# Patient Record
Sex: MALE | Race: WHITE | NOT HISPANIC OR LATINO | Employment: OTHER | URBAN - METROPOLITAN AREA
[De-identification: names, ages, dates, MRNs, and addresses within clinical notes are randomized per-mention and may not be internally consistent; named-entity substitution may affect disease eponyms.]

---

## 2017-10-10 ENCOUNTER — ESTABLISHED COMPREHENSIVE EXAM (OUTPATIENT)
Dept: URBAN - METROPOLITAN AREA CLINIC 46 | Facility: CLINIC | Age: 77
End: 2017-10-10

## 2017-10-10 DIAGNOSIS — H04.123: ICD-10-CM

## 2017-10-10 DIAGNOSIS — H35.30: ICD-10-CM

## 2017-10-10 DIAGNOSIS — H40.013: ICD-10-CM

## 2017-10-10 DIAGNOSIS — Z96.1: ICD-10-CM

## 2017-10-10 PROCEDURE — G8427 DOCREV CUR MEDS BY ELIG CLIN: HCPCS

## 2017-10-10 PROCEDURE — 2019F DILATED MACUL EXAM DONE: CPT

## 2017-10-10 PROCEDURE — 4177F TALK PT/CRGVR RE AREDS PREV: CPT

## 2017-10-10 PROCEDURE — 92014 COMPRE OPH EXAM EST PT 1/>: CPT

## 2017-10-10 PROCEDURE — G8756 NO BP MEASURE DOC: HCPCS

## 2017-10-10 PROCEDURE — 92015 DETERMINE REFRACTIVE STATE: CPT

## 2017-10-10 PROCEDURE — 1036F TOBACCO NON-USER: CPT

## 2017-10-10 ASSESSMENT — VISUAL ACUITY
OD_CC: J5
OD_SC: J8
OS_SC: 20/50
OD_SC: 20/60+2
OS_CC: J3
OS_SC: J6

## 2017-10-10 ASSESSMENT — TONOMETRY
OS_IOP_MMHG: 17
OD_IOP_MMHG: 16

## 2017-11-02 ENCOUNTER — FOLLOW UP (OUTPATIENT)
Dept: URBAN - METROPOLITAN AREA CLINIC 46 | Facility: CLINIC | Age: 77
End: 2017-11-02

## 2017-11-02 DIAGNOSIS — H40.013: ICD-10-CM

## 2017-11-02 DIAGNOSIS — H35.30: ICD-10-CM

## 2017-11-02 PROCEDURE — 92015GRNC REFRACTION GLASSES RECHECK - NO CHARGE

## 2017-11-02 ASSESSMENT — VISUAL ACUITY
OS_AM: 20/30
OD_PAM: 20/30-1
OD_CC: J3
OS_SC: 20/50+2
OD_SC: 20/50-1
OS_CC: J3

## 2017-11-08 ENCOUNTER — ESTABLISHED PATIENT (OUTPATIENT)
Dept: URBAN - METROPOLITAN AREA CLINIC 46 | Facility: CLINIC | Age: 77
End: 2017-11-08

## 2017-11-08 DIAGNOSIS — H40.013: ICD-10-CM

## 2017-11-08 DIAGNOSIS — H35.30: ICD-10-CM

## 2017-11-08 DIAGNOSIS — H35.352: ICD-10-CM

## 2017-11-08 PROCEDURE — 4040F PNEUMOC VAC/ADMIN/RCVD: CPT

## 2017-11-08 PROCEDURE — 2019F DILATED MACUL EXAM DONE: CPT

## 2017-11-08 PROCEDURE — G8484 FLU IMMUNIZE NO ADMIN: HCPCS

## 2017-11-08 PROCEDURE — 99211 OFF/OP EST MAY X REQ PHY/QHP: CPT

## 2017-11-08 PROCEDURE — 2027F OPTIC NERVE HEAD EVAL DONE: CPT

## 2017-11-08 PROCEDURE — 92134 CPTRZ OPH DX IMG PST SGM RTA: CPT

## 2017-11-08 PROCEDURE — 0517F GLAUCOMA PLAN OF CARE DOCD: CPT

## 2017-11-08 PROCEDURE — 3285F IOP DOWN <15% OF PRE-SVC LVL: CPT

## 2017-11-08 PROCEDURE — 1036F TOBACCO NON-USER: CPT

## 2017-11-08 PROCEDURE — 4177F TALK PT/CRGVR RE AREDS PREV: CPT

## 2017-11-08 PROCEDURE — G8428 CUR MEDS NOT DOCUMENT: HCPCS

## 2017-11-08 ASSESSMENT — TONOMETRY
OD_IOP_MMHG: 15
OS_IOP_MMHG: 17

## 2017-11-08 ASSESSMENT — VISUAL ACUITY
OS_SC: 20/50-2
OD_SC: 20/40

## 2017-11-27 ENCOUNTER — CONSULT (OUTPATIENT)
Dept: URBAN - METROPOLITAN AREA CLINIC 43 | Facility: CLINIC | Age: 77
End: 2017-11-27

## 2017-11-27 DIAGNOSIS — H35.363: ICD-10-CM

## 2017-11-27 DIAGNOSIS — H35.3131: ICD-10-CM

## 2017-11-27 DIAGNOSIS — H35.342: ICD-10-CM

## 2017-11-27 PROCEDURE — 92014 COMPRE OPH EXAM EST PT 1/>: CPT

## 2017-11-27 PROCEDURE — 4177F TALK PT/CRGVR RE AREDS PREV: CPT

## 2017-11-27 PROCEDURE — 92250 FUNDUS PHOTOGRAPHY W/I&R: CPT

## 2017-11-27 PROCEDURE — G8427 DOCREV CUR MEDS BY ELIG CLIN: HCPCS

## 2017-11-27 PROCEDURE — G8756 NO BP MEASURE DOC: HCPCS

## 2017-11-27 PROCEDURE — 2019F DILATED MACUL EXAM DONE: CPT

## 2017-11-27 PROCEDURE — 92134 CPTRZ OPH DX IMG PST SGM RTA: CPT

## 2017-11-27 PROCEDURE — 1036F TOBACCO NON-USER: CPT

## 2017-11-27 PROCEDURE — 92235 FLUORESCEIN ANGRPH MLTIFRAME: CPT

## 2017-11-27 ASSESSMENT — TONOMETRY
OD_IOP_MMHG: 17
OS_IOP_MMHG: 16

## 2017-11-27 ASSESSMENT — VISUAL ACUITY
OD_SC: 20/70
OS_SC: 20/50-1

## 2018-03-30 ENCOUNTER — ESTABLISHED PATIENT (OUTPATIENT)
Dept: URBAN - METROPOLITAN AREA CLINIC 43 | Facility: CLINIC | Age: 78
End: 2018-03-30

## 2018-03-30 DIAGNOSIS — H35.342: ICD-10-CM

## 2018-03-30 DIAGNOSIS — H35.3131: ICD-10-CM

## 2018-03-30 DIAGNOSIS — H35.372: ICD-10-CM

## 2018-03-30 DIAGNOSIS — H35.363: ICD-10-CM

## 2018-03-30 DIAGNOSIS — H35.412: ICD-10-CM

## 2018-03-30 DIAGNOSIS — H35.432: ICD-10-CM

## 2018-03-30 PROCEDURE — 92014 COMPRE OPH EXAM EST PT 1/>: CPT

## 2018-03-30 PROCEDURE — 9222650 BILAT EXTENDED OPHTHALMOSCOPY, F/U

## 2018-03-30 PROCEDURE — 2019F DILATED MACUL EXAM DONE: CPT

## 2018-03-30 PROCEDURE — 4177F TALK PT/CRGVR RE AREDS PREV: CPT

## 2018-03-30 PROCEDURE — G8427 DOCREV CUR MEDS BY ELIG CLIN: HCPCS

## 2018-03-30 PROCEDURE — 92134 CPTRZ OPH DX IMG PST SGM RTA: CPT

## 2018-03-30 PROCEDURE — G8756 NO BP MEASURE DOC: HCPCS

## 2018-03-30 PROCEDURE — 1036F TOBACCO NON-USER: CPT

## 2018-03-30 ASSESSMENT — VISUAL ACUITY
OS_SC: 20/40-2
OD_SC: 20/70-1

## 2018-03-30 ASSESSMENT — TONOMETRY
OD_IOP_MMHG: 15
OS_IOP_MMHG: 18

## 2018-08-09 ENCOUNTER — ESTABLISHED PATIENT (OUTPATIENT)
Dept: URBAN - METROPOLITAN AREA CLINIC 43 | Facility: CLINIC | Age: 78
End: 2018-08-09

## 2018-08-09 DIAGNOSIS — H35.372: ICD-10-CM

## 2018-08-09 DIAGNOSIS — H35.3131: ICD-10-CM

## 2018-08-09 DIAGNOSIS — H35.432: ICD-10-CM

## 2018-08-09 DIAGNOSIS — H35.412: ICD-10-CM

## 2018-08-09 DIAGNOSIS — H35.363: ICD-10-CM

## 2018-08-09 DIAGNOSIS — H35.342: ICD-10-CM

## 2018-08-09 PROCEDURE — 92014 COMPRE OPH EXAM EST PT 1/>: CPT

## 2018-08-09 PROCEDURE — 1036F TOBACCO NON-USER: CPT

## 2018-08-09 PROCEDURE — 4177F TALK PT/CRGVR RE AREDS PREV: CPT

## 2018-08-09 PROCEDURE — 2019F DILATED MACUL EXAM DONE: CPT

## 2018-08-09 PROCEDURE — 9222650 BILAT EXTENDED OPHTHALMOSCOPY, F/U

## 2018-08-09 PROCEDURE — 92134 CPTRZ OPH DX IMG PST SGM RTA: CPT

## 2018-08-09 PROCEDURE — G8756 NO BP MEASURE DOC: HCPCS

## 2018-08-09 PROCEDURE — G8427 DOCREV CUR MEDS BY ELIG CLIN: HCPCS

## 2018-08-09 PROCEDURE — G9903 PT SCRN TBCO ID AS NON USER: HCPCS

## 2018-08-09 ASSESSMENT — VISUAL ACUITY
OD_SC: 20/50-2
OS_SC: 20/50-1

## 2018-08-09 ASSESSMENT — TONOMETRY
OD_IOP_MMHG: 15
OS_IOP_MMHG: 16

## 2018-11-21 ENCOUNTER — ESTABLISHED COMPREHENSIVE EXAM (OUTPATIENT)
Dept: URBAN - METROPOLITAN AREA CLINIC 46 | Facility: CLINIC | Age: 78
End: 2018-11-21

## 2018-11-21 DIAGNOSIS — H35.3131: ICD-10-CM

## 2018-11-21 DIAGNOSIS — H40.013: ICD-10-CM

## 2018-11-21 DIAGNOSIS — H04.123: ICD-10-CM

## 2018-11-21 PROCEDURE — 4177F TALK PT/CRGVR RE AREDS PREV: CPT

## 2018-11-21 PROCEDURE — 92014 COMPRE OPH EXAM EST PT 1/>: CPT

## 2018-11-21 PROCEDURE — 1036F TOBACCO NON-USER: CPT

## 2018-11-21 PROCEDURE — G9903 PT SCRN TBCO ID AS NON USER: HCPCS

## 2018-11-21 PROCEDURE — G8428 CUR MEDS NOT DOCUMENT: HCPCS

## 2018-11-21 PROCEDURE — 92015 DETERMINE REFRACTIVE STATE: CPT

## 2018-11-21 PROCEDURE — 2019F DILATED MACUL EXAM DONE: CPT

## 2018-11-21 ASSESSMENT — VISUAL ACUITY
OD_CC: J2
OD_SC: 20/40-1
OS_CC: J2+2
OS_SC: 20/40
OD_SC: J6+1

## 2018-11-21 ASSESSMENT — TONOMETRY
OD_IOP_MMHG: 17
OS_IOP_MMHG: 18

## 2019-02-07 ENCOUNTER — ESTABLISHED COMPREHENSIVE EXAM (OUTPATIENT)
Dept: URBAN - METROPOLITAN AREA CLINIC 43 | Facility: CLINIC | Age: 79
End: 2019-02-07

## 2019-02-07 DIAGNOSIS — H35.3131: ICD-10-CM

## 2019-02-07 DIAGNOSIS — H35.343: ICD-10-CM

## 2019-02-07 DIAGNOSIS — H43.813: ICD-10-CM

## 2019-02-07 DIAGNOSIS — H35.372: ICD-10-CM

## 2019-02-07 DIAGNOSIS — H35.432: ICD-10-CM

## 2019-02-07 PROCEDURE — 92134 CPTRZ OPH DX IMG PST SGM RTA: CPT

## 2019-02-07 PROCEDURE — 92014 COMPRE OPH EXAM EST PT 1/>: CPT

## 2019-02-07 ASSESSMENT — TONOMETRY
OS_IOP_MMHG: 16
OD_IOP_MMHG: 16

## 2019-02-07 ASSESSMENT — VISUAL ACUITY
OD_CC: 20/60-2
OS_CC: 20/50-2

## 2019-10-28 ENCOUNTER — ESTABLISHED COMPREHENSIVE EXAM (OUTPATIENT)
Dept: URBAN - METROPOLITAN AREA CLINIC 46 | Facility: CLINIC | Age: 79
End: 2019-10-28

## 2019-10-28 DIAGNOSIS — H57.11: ICD-10-CM

## 2019-10-28 DIAGNOSIS — H40.013: ICD-10-CM

## 2019-10-28 PROCEDURE — 92014 COMPRE OPH EXAM EST PT 1/>: CPT

## 2019-10-28 PROCEDURE — 92015 DETERMINE REFRACTIVE STATE: CPT

## 2019-10-28 ASSESSMENT — VISUAL ACUITY
OD_CC: J3
OS_SC: J6
OS_SC: 20/50-2
OD_SC: 20/40-1
OS_CC: J3
OD_SC: J6-1

## 2019-10-28 ASSESSMENT — TONOMETRY
OS_IOP_MMHG: 18
OD_IOP_MMHG: 17

## 2020-07-02 ENCOUNTER — ESTABLISHED COMPREHENSIVE EXAM (OUTPATIENT)
Dept: URBAN - METROPOLITAN AREA CLINIC 43 | Facility: CLINIC | Age: 80
End: 2020-07-02

## 2020-07-02 DIAGNOSIS — H35.412: ICD-10-CM

## 2020-07-02 DIAGNOSIS — H35.432: ICD-10-CM

## 2020-07-02 DIAGNOSIS — H35.3131: ICD-10-CM

## 2020-07-02 DIAGNOSIS — H35.373: ICD-10-CM

## 2020-07-02 DIAGNOSIS — H35.363: ICD-10-CM

## 2020-07-02 DIAGNOSIS — H35.343: ICD-10-CM

## 2020-07-02 PROCEDURE — 92014 COMPRE OPH EXAM EST PT 1/>: CPT

## 2020-07-02 PROCEDURE — 92134 CPTRZ OPH DX IMG PST SGM RTA: CPT

## 2020-07-02 PROCEDURE — 92201 OPSCPY EXTND RTA DRAW UNI/BI: CPT

## 2020-07-02 ASSESSMENT — TONOMETRY
OS_IOP_MMHG: 18
OD_IOP_MMHG: 16

## 2020-07-02 ASSESSMENT — VISUAL ACUITY
OD_SC: 20/60+2
OS_SC: 20/70+1

## 2020-10-05 ENCOUNTER — EST. PATIENT EMERGENCY (OUTPATIENT)
Dept: URBAN - METROPOLITAN AREA CLINIC 36 | Facility: CLINIC | Age: 80
End: 2020-10-05

## 2020-10-05 VITALS — HEIGHT: 60 IN

## 2020-10-05 DIAGNOSIS — H35.412: ICD-10-CM

## 2020-10-05 DIAGNOSIS — H35.363: ICD-10-CM

## 2020-10-05 DIAGNOSIS — H52.13: ICD-10-CM

## 2020-10-05 DIAGNOSIS — H15.832: ICD-10-CM

## 2020-10-05 DIAGNOSIS — H35.343: ICD-10-CM

## 2020-10-05 DIAGNOSIS — H35.3131: ICD-10-CM

## 2020-10-05 DIAGNOSIS — H35.371: ICD-10-CM

## 2020-10-05 DIAGNOSIS — H35.432: ICD-10-CM

## 2020-10-05 DIAGNOSIS — H35.372: ICD-10-CM

## 2020-10-05 DIAGNOSIS — Z96.1: ICD-10-CM

## 2020-10-05 DIAGNOSIS — H33.42: ICD-10-CM

## 2020-10-05 PROCEDURE — 92134 CPTRZ OPH DX IMG PST SGM RTA: CPT

## 2020-10-05 PROCEDURE — 92202 OPSCPY EXTND ON/MAC DRAW: CPT

## 2020-10-05 PROCEDURE — 92014 COMPRE OPH EXAM EST PT 1/>: CPT

## 2020-10-05 ASSESSMENT — VISUAL ACUITY
OS_SC: 20/400
OD_SC: 20/40

## 2020-10-05 ASSESSMENT — TONOMETRY: OS_IOP_MMHG: 18

## 2020-10-16 ENCOUNTER — ESTABLISHED PATIENT (OUTPATIENT)
Dept: URBAN - METROPOLITAN AREA CLINIC 43 | Facility: CLINIC | Age: 80
End: 2020-10-16

## 2020-10-16 DIAGNOSIS — Z98.890: ICD-10-CM

## 2020-10-16 PROCEDURE — 99024 POSTOP FOLLOW-UP VISIT: CPT

## 2020-10-16 PROCEDURE — 92202 OPSCPY EXTND ON/MAC DRAW: CPT

## 2020-10-16 RX ORDER — NEOMYCIN SULFATE, POLYMYXIN B SULFATE AND DEXAMETHASONE 3.5; 10000; 1 MG/ML; [USP'U]/ML; MG/ML
1 SUSPENSION OPHTHALMIC
Start: 2020-10-16

## 2020-10-16 ASSESSMENT — TONOMETRY
OS_IOP_MMHG: 19
OD_IOP_MMHG: 17

## 2020-10-16 ASSESSMENT — VISUAL ACUITY
OS_SC: 20/400
OD_SC: 20/50-2

## 2020-10-23 ENCOUNTER — ESTABLISHED PATIENT (OUTPATIENT)
Dept: URBAN - METROPOLITAN AREA CLINIC 43 | Facility: CLINIC | Age: 80
End: 2020-10-23

## 2020-10-23 DIAGNOSIS — H35.412: ICD-10-CM

## 2020-10-23 DIAGNOSIS — H35.3131: ICD-10-CM

## 2020-10-23 DIAGNOSIS — H35.372: ICD-10-CM

## 2020-10-23 DIAGNOSIS — H35.371: ICD-10-CM

## 2020-10-23 DIAGNOSIS — Z96.1: ICD-10-CM

## 2020-10-23 DIAGNOSIS — H35.341: ICD-10-CM

## 2020-10-23 DIAGNOSIS — H15.832: ICD-10-CM

## 2020-10-23 DIAGNOSIS — H35.363: ICD-10-CM

## 2020-10-23 DIAGNOSIS — Z98.890: ICD-10-CM

## 2020-10-23 DIAGNOSIS — H52.13: ICD-10-CM

## 2020-10-23 DIAGNOSIS — H35.432: ICD-10-CM

## 2020-10-23 DIAGNOSIS — H35.342: ICD-10-CM

## 2020-10-23 PROCEDURE — 92134 CPTRZ OPH DX IMG PST SGM RTA: CPT

## 2020-10-23 PROCEDURE — 92012 INTRM OPH EXAM EST PATIENT: CPT

## 2020-10-23 PROCEDURE — 92202 OPSCPY EXTND ON/MAC DRAW: CPT

## 2020-10-23 ASSESSMENT — VISUAL ACUITY
OS_SC: 20/60-2
OD_SC: 20/50

## 2020-10-23 ASSESSMENT — TONOMETRY
OD_IOP_MMHG: 15
OS_IOP_MMHG: 17

## 2020-12-18 ENCOUNTER — ESTABLISHED PATIENT (OUTPATIENT)
Dept: URBAN - METROPOLITAN AREA CLINIC 43 | Facility: CLINIC | Age: 80
End: 2020-12-18

## 2020-12-18 DIAGNOSIS — H35.341: ICD-10-CM

## 2020-12-18 DIAGNOSIS — H15.832: ICD-10-CM

## 2020-12-18 DIAGNOSIS — H35.372: ICD-10-CM

## 2020-12-18 DIAGNOSIS — H35.363: ICD-10-CM

## 2020-12-18 DIAGNOSIS — H35.412: ICD-10-CM

## 2020-12-18 DIAGNOSIS — H35.342: ICD-10-CM

## 2020-12-18 DIAGNOSIS — Z98.890: ICD-10-CM

## 2020-12-18 DIAGNOSIS — H35.3131: ICD-10-CM

## 2020-12-18 DIAGNOSIS — H35.432: ICD-10-CM

## 2020-12-18 DIAGNOSIS — H35.371: ICD-10-CM

## 2020-12-18 PROCEDURE — 92134 CPTRZ OPH DX IMG PST SGM RTA: CPT

## 2020-12-18 PROCEDURE — 92202 OPSCPY EXTND ON/MAC DRAW: CPT

## 2020-12-18 PROCEDURE — 92014 COMPRE OPH EXAM EST PT 1/>: CPT

## 2020-12-18 ASSESSMENT — VISUAL ACUITY
OD_SC: 20/50+2
OS_SC: 20/70-1

## 2020-12-18 ASSESSMENT — TONOMETRY
OS_IOP_MMHG: 17
OD_IOP_MMHG: 16

## 2021-06-17 ENCOUNTER — ESTABLISHED COMPREHENSIVE EXAM (OUTPATIENT)
Dept: URBAN - METROPOLITAN AREA CLINIC 43 | Facility: CLINIC | Age: 81
End: 2021-06-17

## 2021-06-17 DIAGNOSIS — H35.363: ICD-10-CM

## 2021-06-17 DIAGNOSIS — H35.432: ICD-10-CM

## 2021-06-17 DIAGNOSIS — H35.352: ICD-10-CM

## 2021-06-17 DIAGNOSIS — Z98.890: ICD-10-CM

## 2021-06-17 DIAGNOSIS — H35.412: ICD-10-CM

## 2021-06-17 DIAGNOSIS — H35.341: ICD-10-CM

## 2021-06-17 DIAGNOSIS — H35.3131: ICD-10-CM

## 2021-06-17 DIAGNOSIS — H15.832: ICD-10-CM

## 2021-06-17 DIAGNOSIS — H35.371: ICD-10-CM

## 2021-06-17 PROCEDURE — 92014 COMPRE OPH EXAM EST PT 1/>: CPT

## 2021-06-17 PROCEDURE — 92134 CPTRZ OPH DX IMG PST SGM RTA: CPT

## 2021-06-17 PROCEDURE — 92202 OPSCPY EXTND ON/MAC DRAW: CPT

## 2021-06-17 RX ORDER — BROMFENAC 0.76 MG/ML
1 SOLUTION/ DROPS OPHTHALMIC TWICE A DAY
Start: 2021-06-17

## 2021-06-17 ASSESSMENT — VISUAL ACUITY
OD_SC: 20/60-1
OS_SC: 20/60-2

## 2021-06-17 ASSESSMENT — TONOMETRY
OD_IOP_MMHG: 15
OS_IOP_MMHG: 18

## 2021-07-15 ENCOUNTER — ESTABLISHED PATIENT (OUTPATIENT)
Dept: URBAN - METROPOLITAN AREA CLINIC 43 | Facility: CLINIC | Age: 81
End: 2021-07-15

## 2021-07-15 DIAGNOSIS — H35.363: ICD-10-CM

## 2021-07-15 DIAGNOSIS — H35.341: ICD-10-CM

## 2021-07-15 DIAGNOSIS — H35.412: ICD-10-CM

## 2021-07-15 DIAGNOSIS — H35.432: ICD-10-CM

## 2021-07-15 DIAGNOSIS — H52.13: ICD-10-CM

## 2021-07-15 DIAGNOSIS — H35.371: ICD-10-CM

## 2021-07-15 DIAGNOSIS — H35.352: ICD-10-CM

## 2021-07-15 DIAGNOSIS — H15.832: ICD-10-CM

## 2021-07-15 DIAGNOSIS — Z98.890: ICD-10-CM

## 2021-07-15 DIAGNOSIS — H35.3131: ICD-10-CM

## 2021-07-15 PROCEDURE — 92134 CPTRZ OPH DX IMG PST SGM RTA: CPT

## 2021-07-15 PROCEDURE — 92014 COMPRE OPH EXAM EST PT 1/>: CPT

## 2021-07-15 PROCEDURE — 92202 OPSCPY EXTND ON/MAC DRAW: CPT

## 2021-07-15 RX ORDER — PREDNISOLONE ACETATE 10 MG/ML
1 SUSPENSION/ DROPS OPHTHALMIC
Start: 2021-07-15

## 2021-07-15 ASSESSMENT — TONOMETRY
OS_IOP_MMHG: 16
OD_IOP_MMHG: 14

## 2021-07-15 ASSESSMENT — VISUAL ACUITY
OD_SC: 20/50-1
OS_SC: 20/70-1

## 2021-08-19 ENCOUNTER — ESTABLISHED PATIENT (OUTPATIENT)
Dept: URBAN - METROPOLITAN AREA CLINIC 43 | Facility: CLINIC | Age: 81
End: 2021-08-19

## 2021-08-19 DIAGNOSIS — H35.363: ICD-10-CM

## 2021-08-19 DIAGNOSIS — H35.432: ICD-10-CM

## 2021-08-19 DIAGNOSIS — H15.832: ICD-10-CM

## 2021-08-19 DIAGNOSIS — H35.371: ICD-10-CM

## 2021-08-19 DIAGNOSIS — H35.412: ICD-10-CM

## 2021-08-19 DIAGNOSIS — H35.352: ICD-10-CM

## 2021-08-19 DIAGNOSIS — H35.3131: ICD-10-CM

## 2021-08-19 DIAGNOSIS — H35.341: ICD-10-CM

## 2021-08-19 PROCEDURE — 92134 CPTRZ OPH DX IMG PST SGM RTA: CPT

## 2021-08-19 PROCEDURE — 67028 INJECTION EYE DRUG: CPT

## 2021-08-19 PROCEDURE — 92014 COMPRE OPH EXAM EST PT 1/>: CPT

## 2021-08-19 PROCEDURE — 92202 OPSCPY EXTND ON/MAC DRAW: CPT

## 2021-08-19 PROCEDURE — 92250 FUNDUS PHOTOGRAPHY W/I&R: CPT

## 2021-08-19 ASSESSMENT — VISUAL ACUITY
OS_SC: 20/100+1
OD_SC: 20/60-1

## 2021-08-19 ASSESSMENT — TONOMETRY
OD_IOP_MMHG: 16
OS_IOP_MMHG: 17

## 2021-10-01 ENCOUNTER — ESTABLISHED PATIENT (OUTPATIENT)
Dept: URBAN - METROPOLITAN AREA CLINIC 43 | Facility: CLINIC | Age: 81
End: 2021-10-01

## 2021-10-01 DIAGNOSIS — H15.832: ICD-10-CM

## 2021-10-01 DIAGNOSIS — H35.412: ICD-10-CM

## 2021-10-01 DIAGNOSIS — H35.363: ICD-10-CM

## 2021-10-01 DIAGNOSIS — H35.432: ICD-10-CM

## 2021-10-01 DIAGNOSIS — H35.371: ICD-10-CM

## 2021-10-01 DIAGNOSIS — H35.352: ICD-10-CM

## 2021-10-01 DIAGNOSIS — H35.341: ICD-10-CM

## 2021-10-01 DIAGNOSIS — H35.3131: ICD-10-CM

## 2021-10-01 DIAGNOSIS — Z98.890: ICD-10-CM

## 2021-10-01 PROCEDURE — 92202 OPSCPY EXTND ON/MAC DRAW: CPT

## 2021-10-01 PROCEDURE — 67028 INJECTION EYE DRUG: CPT

## 2021-10-01 PROCEDURE — 92014 COMPRE OPH EXAM EST PT 1/>: CPT

## 2021-10-01 PROCEDURE — 92134 CPTRZ OPH DX IMG PST SGM RTA: CPT

## 2021-10-01 ASSESSMENT — TONOMETRY
OD_IOP_MMHG: 10
OS_IOP_MMHG: 12

## 2021-10-01 ASSESSMENT — VISUAL ACUITY
OS_SC: 20/70
OD_SC: 20/50-1

## 2021-11-19 ENCOUNTER — EST. PATIENT EMERGENCY (OUTPATIENT)
Dept: URBAN - METROPOLITAN AREA CLINIC 43 | Facility: CLINIC | Age: 81
End: 2021-11-19

## 2021-11-19 DIAGNOSIS — H35.3131: ICD-10-CM

## 2021-11-19 DIAGNOSIS — H35.352: ICD-10-CM

## 2021-11-19 DIAGNOSIS — H35.341: ICD-10-CM

## 2021-11-19 DIAGNOSIS — H35.363: ICD-10-CM

## 2021-11-19 DIAGNOSIS — H15.832: ICD-10-CM

## 2021-11-19 DIAGNOSIS — H35.412: ICD-10-CM

## 2021-11-19 DIAGNOSIS — H35.432: ICD-10-CM

## 2021-11-19 DIAGNOSIS — H33.012: ICD-10-CM

## 2021-11-19 DIAGNOSIS — H35.371: ICD-10-CM

## 2021-11-19 DIAGNOSIS — Z98.890: ICD-10-CM

## 2021-11-19 PROCEDURE — 92134 CPTRZ OPH DX IMG PST SGM RTA: CPT

## 2021-11-19 PROCEDURE — 92201 OPSCPY EXTND RTA DRAW UNI/BI: CPT

## 2021-11-19 PROCEDURE — 99214 OFFICE O/P EST MOD 30 MIN: CPT

## 2021-11-19 PROCEDURE — 92250 FUNDUS PHOTOGRAPHY W/I&R: CPT

## 2021-11-19 RX ORDER — CYCLOPENTOLATE HYDROCHLORIDE 10 MG/ML: 1 SOLUTION OPHTHALMIC

## 2021-11-19 ASSESSMENT — VISUAL ACUITY
OD_SC: 20/40-2
OS_SC: CF 4FT

## 2021-11-19 ASSESSMENT — TONOMETRY: OD_IOP_MMHG: 13

## 2021-11-23 ENCOUNTER — ESTABLISHED PATIENT (OUTPATIENT)
Dept: URBAN - METROPOLITAN AREA CLINIC 39 | Facility: CLINIC | Age: 81
End: 2021-11-23

## 2021-11-23 DIAGNOSIS — H35.412: ICD-10-CM

## 2021-11-23 DIAGNOSIS — H35.352: ICD-10-CM

## 2021-11-23 DIAGNOSIS — H35.341: ICD-10-CM

## 2021-11-23 DIAGNOSIS — H35.432: ICD-10-CM

## 2021-11-23 DIAGNOSIS — H35.371: ICD-10-CM

## 2021-11-23 DIAGNOSIS — H35.363: ICD-10-CM

## 2021-11-23 DIAGNOSIS — H35.3131: ICD-10-CM

## 2021-11-23 PROCEDURE — 92202 OPSCPY EXTND ON/MAC DRAW: CPT

## 2021-11-23 PROCEDURE — 92014 COMPRE OPH EXAM EST PT 1/>: CPT

## 2021-11-23 RX ORDER — NEOMYCIN SULFATE, POLYMYXIN B SULFATE AND DEXAMETHASONE 3.5; 10000; 1 MG/ML; [USP'U]/ML; MG/ML: 1 SUSPENSION OPHTHALMIC

## 2021-11-23 ASSESSMENT — TONOMETRY
OD_IOP_MMHG: 12
OS_IOP_MMHG: 6

## 2021-12-01 ENCOUNTER — POST-OP (OUTPATIENT)
Dept: URBAN - METROPOLITAN AREA CLINIC 43 | Facility: CLINIC | Age: 81
End: 2021-12-01

## 2021-12-01 DIAGNOSIS — H15.832: ICD-10-CM

## 2021-12-01 DIAGNOSIS — H35.3131: ICD-10-CM

## 2021-12-01 DIAGNOSIS — H35.352: ICD-10-CM

## 2021-12-01 DIAGNOSIS — H35.371: ICD-10-CM

## 2021-12-01 DIAGNOSIS — H35.363: ICD-10-CM

## 2021-12-01 DIAGNOSIS — Z98.890: ICD-10-CM

## 2021-12-01 DIAGNOSIS — H35.432: ICD-10-CM

## 2021-12-01 DIAGNOSIS — H35.341: ICD-10-CM

## 2021-12-01 DIAGNOSIS — H40.013: ICD-10-CM

## 2021-12-01 DIAGNOSIS — H35.412: ICD-10-CM

## 2021-12-01 PROCEDURE — 92202 OPSCPY EXTND ON/MAC DRAW: CPT

## 2021-12-01 PROCEDURE — 66999PO NON CO-MANAGED OTHER SURGERY PO

## 2021-12-01 ASSESSMENT — VISUAL ACUITY
OD_SC: 20/60
OS_SC: CF 2FT
OD_SC: 20/60-1

## 2021-12-01 ASSESSMENT — TONOMETRY
OD_IOP_MMHG: 13
OS_IOP_MMHG: 08

## 2021-12-31 ENCOUNTER — POST-OP (OUTPATIENT)
Dept: URBAN - METROPOLITAN AREA CLINIC 43 | Facility: CLINIC | Age: 81
End: 2021-12-31

## 2021-12-31 DIAGNOSIS — Z98.890: ICD-10-CM

## 2021-12-31 DIAGNOSIS — H35.371: ICD-10-CM

## 2021-12-31 DIAGNOSIS — H35.352: ICD-10-CM

## 2021-12-31 DIAGNOSIS — H40.013: ICD-10-CM

## 2021-12-31 DIAGNOSIS — H35.432: ICD-10-CM

## 2021-12-31 DIAGNOSIS — H35.363: ICD-10-CM

## 2021-12-31 DIAGNOSIS — H15.832: ICD-10-CM

## 2021-12-31 DIAGNOSIS — H35.3131: ICD-10-CM

## 2021-12-31 DIAGNOSIS — H35.412: ICD-10-CM

## 2021-12-31 DIAGNOSIS — H35.341: ICD-10-CM

## 2021-12-31 PROCEDURE — 66999PO NON CO-MANAGED OTHER SURGERY PO

## 2021-12-31 ASSESSMENT — VISUAL ACUITY
OS_SC: 20/400
OD_SC: 20/60

## 2021-12-31 ASSESSMENT — TONOMETRY
OS_IOP_MMHG: 14
OD_IOP_MMHG: 13

## 2022-02-04 ENCOUNTER — ESTABLISHED PATIENT (OUTPATIENT)
Dept: URBAN - METROPOLITAN AREA CLINIC 43 | Facility: CLINIC | Age: 82
End: 2022-02-04

## 2022-02-04 DIAGNOSIS — H35.371: ICD-10-CM

## 2022-02-04 DIAGNOSIS — H35.432: ICD-10-CM

## 2022-02-04 DIAGNOSIS — H35.412: ICD-10-CM

## 2022-02-04 DIAGNOSIS — Z98.890: ICD-10-CM

## 2022-02-04 DIAGNOSIS — H40.013: ICD-10-CM

## 2022-02-04 DIAGNOSIS — H35.363: ICD-10-CM

## 2022-02-04 DIAGNOSIS — H35.341: ICD-10-CM

## 2022-02-04 DIAGNOSIS — H15.832: ICD-10-CM

## 2022-02-04 DIAGNOSIS — H35.3131: ICD-10-CM

## 2022-02-04 DIAGNOSIS — H35.352: ICD-10-CM

## 2022-02-04 PROCEDURE — 92134 CPTRZ OPH DX IMG PST SGM RTA: CPT

## 2022-02-04 PROCEDURE — 92202 OPSCPY EXTND ON/MAC DRAW: CPT

## 2022-02-04 PROCEDURE — 92014 COMPRE OPH EXAM EST PT 1/>: CPT

## 2022-02-04 RX ORDER — BROMFENAC 0.76 MG/ML: 1 SOLUTION/ DROPS OPHTHALMIC ONCE A DAY

## 2022-02-04 RX ORDER — PREDNISOLONE ACETATE 10 MG/ML: 1 SUSPENSION/ DROPS OPHTHALMIC

## 2022-02-04 ASSESSMENT — TONOMETRY
OD_IOP_MMHG: 14
OS_IOP_MMHG: 13

## 2022-02-04 ASSESSMENT — VISUAL ACUITY
OD_SC: 20/50-1+1
OS_SC: 20/400

## 2022-03-04 ENCOUNTER — ESTABLISHED PATIENT (OUTPATIENT)
Dept: URBAN - METROPOLITAN AREA CLINIC 43 | Facility: CLINIC | Age: 82
End: 2022-03-04

## 2022-03-04 DIAGNOSIS — H40.013: ICD-10-CM

## 2022-03-04 DIAGNOSIS — Z98.890: ICD-10-CM

## 2022-03-04 DIAGNOSIS — H35.432: ICD-10-CM

## 2022-03-04 DIAGNOSIS — H35.341: ICD-10-CM

## 2022-03-04 DIAGNOSIS — H35.363: ICD-10-CM

## 2022-03-04 DIAGNOSIS — H15.832: ICD-10-CM

## 2022-03-04 DIAGNOSIS — H35.352: ICD-10-CM

## 2022-03-04 DIAGNOSIS — H35.412: ICD-10-CM

## 2022-03-04 DIAGNOSIS — H35.3131: ICD-10-CM

## 2022-03-04 DIAGNOSIS — H35.371: ICD-10-CM

## 2022-03-04 PROCEDURE — 92134 CPTRZ OPH DX IMG PST SGM RTA: CPT

## 2022-03-04 PROCEDURE — 92012 INTRM OPH EXAM EST PATIENT: CPT

## 2022-03-04 ASSESSMENT — TONOMETRY
OS_IOP_MMHG: 14
OD_IOP_MMHG: 13

## 2022-03-04 ASSESSMENT — VISUAL ACUITY
OS_PH: 20/100
OS_SC: 20/150
OD_SC: 20/40-1+1

## 2022-03-24 ENCOUNTER — EMERGENCY VISIT (OUTPATIENT)
Dept: URBAN - METROPOLITAN AREA CLINIC 43 | Facility: CLINIC | Age: 82
End: 2022-03-24

## 2022-03-24 DIAGNOSIS — H40.042: ICD-10-CM

## 2022-03-24 DIAGNOSIS — H35.341: ICD-10-CM

## 2022-03-24 DIAGNOSIS — H15.832: ICD-10-CM

## 2022-03-24 DIAGNOSIS — Z98.890: ICD-10-CM

## 2022-03-24 DIAGNOSIS — H35.412: ICD-10-CM

## 2022-03-24 DIAGNOSIS — H35.363: ICD-10-CM

## 2022-03-24 DIAGNOSIS — H35.432: ICD-10-CM

## 2022-03-24 DIAGNOSIS — H35.352: ICD-10-CM

## 2022-03-24 DIAGNOSIS — H35.371: ICD-10-CM

## 2022-03-24 DIAGNOSIS — H35.3131: ICD-10-CM

## 2022-03-24 PROCEDURE — 92202 OPSCPY EXTND ON/MAC DRAW: CPT

## 2022-03-24 PROCEDURE — 92134 CPTRZ OPH DX IMG PST SGM RTA: CPT

## 2022-03-24 PROCEDURE — 92014 COMPRE OPH EXAM EST PT 1/>: CPT

## 2022-03-24 RX ORDER — DORZOLAMIDE HYDROCHLORIDE TIMOLOL MALEATE 20; 5 MG/ML; MG/ML: 1 SOLUTION/ DROPS OPHTHALMIC TWICE A DAY

## 2022-03-24 ASSESSMENT — TONOMETRY
OD_IOP_MMHG: 17
OS_IOP_MMHG: 26

## 2022-03-24 ASSESSMENT — VISUAL ACUITY
OS_PH: 20/200
OD_SC: 20/50
OS_SC: 20/400

## 2022-03-31 ENCOUNTER — FOLLOW UP (OUTPATIENT)
Dept: URBAN - METROPOLITAN AREA CLINIC 43 | Facility: CLINIC | Age: 82
End: 2022-03-31

## 2022-03-31 DIAGNOSIS — H40.042: ICD-10-CM

## 2022-03-31 DIAGNOSIS — H35.352: ICD-10-CM

## 2022-03-31 DIAGNOSIS — H40.052: ICD-10-CM

## 2022-03-31 PROCEDURE — 92012 INTRM OPH EXAM EST PATIENT: CPT

## 2022-03-31 ASSESSMENT — VISUAL ACUITY
OD_SC: 20/60+2
OS_SC: 20/400

## 2022-03-31 ASSESSMENT — TONOMETRY
OS_IOP_MMHG: 11
OD_IOP_MMHG: 16

## 2022-05-06 ENCOUNTER — ESTABLISHED PATIENT (OUTPATIENT)
Dept: URBAN - METROPOLITAN AREA CLINIC 43 | Facility: CLINIC | Age: 82
End: 2022-05-06

## 2022-05-06 DIAGNOSIS — H35.341: ICD-10-CM

## 2022-05-06 DIAGNOSIS — H35.3131: ICD-10-CM

## 2022-05-06 DIAGNOSIS — H40.013: ICD-10-CM

## 2022-05-06 DIAGNOSIS — H35.363: ICD-10-CM

## 2022-05-06 DIAGNOSIS — H35.412: ICD-10-CM

## 2022-05-06 DIAGNOSIS — H40.042: ICD-10-CM

## 2022-05-06 DIAGNOSIS — H15.832: ICD-10-CM

## 2022-05-06 DIAGNOSIS — H35.352: ICD-10-CM

## 2022-05-06 DIAGNOSIS — H35.371: ICD-10-CM

## 2022-05-06 DIAGNOSIS — Z98.890: ICD-10-CM

## 2022-05-06 DIAGNOSIS — H40.052: ICD-10-CM

## 2022-05-06 DIAGNOSIS — H35.432: ICD-10-CM

## 2022-05-06 PROCEDURE — 92202 OPSCPY EXTND ON/MAC DRAW: CPT

## 2022-05-06 PROCEDURE — 92134 CPTRZ OPH DX IMG PST SGM RTA: CPT

## 2022-05-06 PROCEDURE — 92014 COMPRE OPH EXAM EST PT 1/>: CPT

## 2022-05-06 ASSESSMENT — TONOMETRY
OD_IOP_MMHG: 12
OS_IOP_MMHG: 12

## 2022-05-06 ASSESSMENT — VISUAL ACUITY
OS_SC: 20/200
OD_SC: 20/60+2

## 2022-06-03 ENCOUNTER — ESTABLISHED PATIENT (OUTPATIENT)
Dept: URBAN - METROPOLITAN AREA CLINIC 43 | Facility: CLINIC | Age: 82
End: 2022-06-03

## 2022-06-03 DIAGNOSIS — H35.3131: ICD-10-CM

## 2022-06-03 DIAGNOSIS — H35.371: ICD-10-CM

## 2022-06-03 DIAGNOSIS — H35.352: ICD-10-CM

## 2022-06-03 DIAGNOSIS — H35.412: ICD-10-CM

## 2022-06-03 DIAGNOSIS — H35.341: ICD-10-CM

## 2022-06-03 DIAGNOSIS — H15.832: ICD-10-CM

## 2022-06-03 DIAGNOSIS — H40.042: ICD-10-CM

## 2022-06-03 DIAGNOSIS — H35.432: ICD-10-CM

## 2022-06-03 DIAGNOSIS — H35.363: ICD-10-CM

## 2022-06-03 DIAGNOSIS — H40.052: ICD-10-CM

## 2022-06-03 PROCEDURE — 92202 OPSCPY EXTND ON/MAC DRAW: CPT

## 2022-06-03 PROCEDURE — 92014 COMPRE OPH EXAM EST PT 1/>: CPT

## 2022-06-03 PROCEDURE — 92134 CPTRZ OPH DX IMG PST SGM RTA: CPT

## 2022-06-03 RX ORDER — BROMFENAC 0.76 MG/ML: 1 SOLUTION/ DROPS OPHTHALMIC ONCE A DAY

## 2022-06-03 ASSESSMENT — VISUAL ACUITY
OS_PH: 20/100-1
OS_SC: 20/200+2
OD_SC: 20/50+2

## 2022-06-03 ASSESSMENT — TONOMETRY
OD_IOP_MMHG: 15
OS_IOP_MMHG: 16

## 2022-08-18 ENCOUNTER — APPOINTMENT (RX ONLY)
Dept: URBAN - METROPOLITAN AREA CLINIC 139 | Facility: CLINIC | Age: 82
Setting detail: DERMATOLOGY
End: 2022-08-18

## 2022-08-18 DIAGNOSIS — L71.8 OTHER ROSACEA: ICD-10-CM

## 2022-08-18 DIAGNOSIS — L57.8 OTHER SKIN CHANGES DUE TO CHRONIC EXPOSURE TO NONIONIZING RADIATION: ICD-10-CM

## 2022-08-18 DIAGNOSIS — L82.1 OTHER SEBORRHEIC KERATOSIS: ICD-10-CM

## 2022-08-18 DIAGNOSIS — D18.0 HEMANGIOMA: ICD-10-CM

## 2022-08-18 DIAGNOSIS — L57.0 ACTINIC KERATOSIS: ICD-10-CM

## 2022-08-18 PROBLEM — D18.01 HEMANGIOMA OF SKIN AND SUBCUTANEOUS TISSUE: Status: ACTIVE | Noted: 2022-08-18

## 2022-08-18 PROCEDURE — 99203 OFFICE O/P NEW LOW 30 MIN: CPT | Mod: 25

## 2022-08-18 PROCEDURE — 17003 DESTRUCT PREMALG LES 2-14: CPT

## 2022-08-18 PROCEDURE — ? LIQUID NITROGEN

## 2022-08-18 PROCEDURE — ? COUNSELING

## 2022-08-18 PROCEDURE — ? PRESCRIPTION

## 2022-08-18 PROCEDURE — 17000 DESTRUCT PREMALG LESION: CPT

## 2022-08-18 RX ORDER — IVERMECTIN 10 MG/G
45G CREAM TOPICAL QD
Qty: 45 | Refills: 12 | COMMUNITY
Start: 2022-08-18

## 2022-08-18 RX ORDER — DOXYCYCLINE HYCLATE 50 MG/1
30 CAPSULE, GELATIN COATED ORAL QD
Qty: 30 | Refills: 6 | Status: ERX | COMMUNITY
Start: 2022-08-18

## 2022-08-18 RX ADMIN — IVERMECTIN 45G: 10 CREAM TOPICAL at 00:00

## 2022-08-18 RX ADMIN — DOXYCYCLINE HYCLATE 30: 50 CAPSULE, GELATIN COATED ORAL at 00:00

## 2022-08-18 ASSESSMENT — LOCATION ZONE DERM
LOCATION ZONE: ARM
LOCATION ZONE: TRUNK
LOCATION ZONE: SCALP
LOCATION ZONE: FACE
LOCATION ZONE: NOSE
LOCATION ZONE: LEG
LOCATION ZONE: NECK

## 2022-08-18 ASSESSMENT — LOCATION DETAILED DESCRIPTION DERM
LOCATION DETAILED: XIPHOID
LOCATION DETAILED: LEFT SUPERIOR UPPER BACK
LOCATION DETAILED: NASAL DORSUM
LOCATION DETAILED: RIGHT SUPERIOR UPPER BACK
LOCATION DETAILED: PERIUMBILICAL SKIN
LOCATION DETAILED: RIGHT MEDIAL INFERIOR CHEST
LOCATION DETAILED: RIGHT PROXIMAL PRETIBIAL REGION
LOCATION DETAILED: RIGHT MID-UPPER BACK
LOCATION DETAILED: LEFT LATERAL SUPERIOR CHEST
LOCATION DETAILED: RIGHT INFERIOR LATERAL MIDBACK
LOCATION DETAILED: RIGHT PROXIMAL DORSAL FOREARM
LOCATION DETAILED: RIGHT CENTRAL MALAR CHEEK
LOCATION DETAILED: RIGHT MEDIAL UPPER BACK
LOCATION DETAILED: LEFT MEDIAL SUPERIOR CHEST
LOCATION DETAILED: RIGHT CLAVICULAR SKIN
LOCATION DETAILED: LEFT CENTRAL MALAR CHEEK
LOCATION DETAILED: RIGHT INFERIOR MEDIAL UPPER BACK
LOCATION DETAILED: LEFT SUPERIOR LATERAL UPPER BACK
LOCATION DETAILED: LEFT PROXIMAL PRETIBIAL REGION
LOCATION DETAILED: LEFT SUPERIOR LATERAL MIDBACK
LOCATION DETAILED: RIGHT RIB CAGE
LOCATION DETAILED: LEFT MID-UPPER BACK
LOCATION DETAILED: RIGHT MEDIAL SUPERIOR CHEST
LOCATION DETAILED: LEFT SUPERIOR MEDIAL MIDBACK
LOCATION DETAILED: RIGHT POSTERIOR SHOULDER
LOCATION DETAILED: LEFT PROXIMAL DORSAL FOREARM
LOCATION DETAILED: LEFT CLAVICULAR NECK
LOCATION DETAILED: LEFT INFERIOR MEDIAL UPPER BACK
LOCATION DETAILED: RIGHT SUPERIOR PARIETAL SCALP

## 2022-08-18 ASSESSMENT — LOCATION SIMPLE DESCRIPTION DERM
LOCATION SIMPLE: LEFT UPPER BACK
LOCATION SIMPLE: RIGHT LOWER BACK
LOCATION SIMPLE: RIGHT FOREARM
LOCATION SIMPLE: ABDOMEN
LOCATION SIMPLE: LEFT CHEEK
LOCATION SIMPLE: RIGHT CHEEK
LOCATION SIMPLE: NOSE
LOCATION SIMPLE: LEFT FOREARM
LOCATION SIMPLE: LEFT LOWER BACK
LOCATION SIMPLE: LEFT ANTERIOR NECK
LOCATION SIMPLE: RIGHT UPPER BACK
LOCATION SIMPLE: RIGHT CLAVICULAR SKIN
LOCATION SIMPLE: LEFT PRETIBIAL REGION
LOCATION SIMPLE: RIGHT SHOULDER
LOCATION SIMPLE: SCALP
LOCATION SIMPLE: CHEST
LOCATION SIMPLE: RIGHT PRETIBIAL REGION

## 2023-05-09 ENCOUNTER — RX ONLY (OUTPATIENT)
Age: 83
Setting detail: RX ONLY
End: 2023-05-09

## 2023-05-09 RX ORDER — DOXYCYCLINE HYCLATE 50 MG/1
30 CAPSULE, GELATIN COATED ORAL QD
Qty: 30 | Refills: 6 | Status: ERX

## 2023-05-23 ENCOUNTER — RX ONLY (OUTPATIENT)
Age: 83
Setting detail: RX ONLY
End: 2023-05-23

## 2023-05-23 RX ORDER — IVERMECTIN 10 MG/G
CREAM TOPICAL
Qty: 45 | Refills: 12

## 2023-07-27 ENCOUNTER — APPOINTMENT (RX ONLY)
Dept: URBAN - METROPOLITAN AREA CLINIC 139 | Facility: CLINIC | Age: 83
Setting detail: DERMATOLOGY
End: 2023-07-27

## 2023-07-27 DIAGNOSIS — L85.3 XEROSIS CUTIS: ICD-10-CM

## 2023-07-27 DIAGNOSIS — L57.8 OTHER SKIN CHANGES DUE TO CHRONIC EXPOSURE TO NONIONIZING RADIATION: ICD-10-CM

## 2023-07-27 DIAGNOSIS — L82.1 OTHER SEBORRHEIC KERATOSIS: ICD-10-CM

## 2023-07-27 DIAGNOSIS — L71.8 OTHER ROSACEA: ICD-10-CM | Status: INADEQUATELY CONTROLLED

## 2023-07-27 DIAGNOSIS — L30.1 DYSHIDROSIS [POMPHOLYX]: ICD-10-CM | Status: INADEQUATELY CONTROLLED

## 2023-07-27 DIAGNOSIS — D18.0 HEMANGIOMA: ICD-10-CM

## 2023-07-27 DIAGNOSIS — L57.0 ACTINIC KERATOSIS: ICD-10-CM

## 2023-07-27 PROBLEM — D18.01 HEMANGIOMA OF SKIN AND SUBCUTANEOUS TISSUE: Status: ACTIVE | Noted: 2023-07-27

## 2023-07-27 PROCEDURE — 17000 DESTRUCT PREMALG LESION: CPT

## 2023-07-27 PROCEDURE — ? PRESCRIPTION

## 2023-07-27 PROCEDURE — ? COUNSELING

## 2023-07-27 PROCEDURE — ? PRESCRIPTION MEDICATION MANAGEMENT

## 2023-07-27 PROCEDURE — 99213 OFFICE O/P EST LOW 20 MIN: CPT | Mod: 25

## 2023-07-27 PROCEDURE — ? LIQUID NITROGEN

## 2023-07-27 PROCEDURE — 17003 DESTRUCT PREMALG LES 2-14: CPT

## 2023-07-27 RX ORDER — BETAMETHASONE DIPROPIONATE 0.5 MG/G
CREAM TOPICAL
Qty: 45 | Refills: 6 | Status: ERX | COMMUNITY
Start: 2023-07-27

## 2023-07-27 RX ORDER — MINOCYCLINE HYDROCHLORIDE 100 MG/1
CAPSULE ORAL BID
Qty: 60 | Refills: 6 | Status: ERX | COMMUNITY
Start: 2023-07-27

## 2023-07-27 RX ADMIN — MINOCYCLINE HYDROCHLORIDE: 100 CAPSULE ORAL at 00:00

## 2023-07-27 RX ADMIN — BETAMETHASONE DIPROPIONATE: 0.5 CREAM TOPICAL at 00:00

## 2023-07-27 ASSESSMENT — LOCATION DETAILED DESCRIPTION DERM
LOCATION DETAILED: RIGHT LATERAL INFERIOR CHEST
LOCATION DETAILED: RIGHT RADIAL DORSAL HAND
LOCATION DETAILED: RIGHT MEDIAL SUPERIOR CHEST
LOCATION DETAILED: LEFT INFERIOR MEDIAL UPPER BACK
LOCATION DETAILED: STERNUM
LOCATION DETAILED: PERIUMBILICAL SKIN
LOCATION DETAILED: RIGHT SUPERIOR MEDIAL MIDBACK
LOCATION DETAILED: LEFT PROXIMAL DORSAL FOREARM
LOCATION DETAILED: RIGHT RADIAL PALM
LOCATION DETAILED: RIGHT MID-UPPER BACK
LOCATION DETAILED: RIGHT SUPERIOR OCCIPITAL SCALP
LOCATION DETAILED: RIGHT LATERAL ABDOMEN
LOCATION DETAILED: LEFT SUPERIOR MEDIAL UPPER BACK
LOCATION DETAILED: LEFT PROXIMAL PRETIBIAL REGION
LOCATION DETAILED: LEFT SUPERIOR PARIETAL SCALP
LOCATION DETAILED: EPIGASTRIC SKIN
LOCATION DETAILED: RIGHT PROXIMAL DORSAL FOREARM
LOCATION DETAILED: RIGHT PROXIMAL PRETIBIAL REGION
LOCATION DETAILED: LEFT INFERIOR MEDIAL MIDBACK
LOCATION DETAILED: RIGHT INFERIOR MEDIAL UPPER BACK
LOCATION DETAILED: RIGHT SUPERIOR PARIETAL SCALP
LOCATION DETAILED: RIGHT INFERIOR UPPER BACK
LOCATION DETAILED: LEFT MEDIAL SUPERIOR CHEST
LOCATION DETAILED: RIGHT MEDIAL INFERIOR CHEST
LOCATION DETAILED: LEFT SUPERIOR MEDIAL MIDBACK
LOCATION DETAILED: LEFT SUPERIOR UPPER BACK
LOCATION DETAILED: RIGHT SUPERIOR UPPER BACK
LOCATION DETAILED: RIGHT MEDIAL FOREHEAD

## 2023-07-27 ASSESSMENT — LOCATION ZONE DERM
LOCATION ZONE: FACE
LOCATION ZONE: LEG
LOCATION ZONE: SCALP
LOCATION ZONE: TRUNK
LOCATION ZONE: ARM
LOCATION ZONE: HAND

## 2023-07-27 ASSESSMENT — LOCATION SIMPLE DESCRIPTION DERM
LOCATION SIMPLE: LEFT UPPER BACK
LOCATION SIMPLE: CHEST
LOCATION SIMPLE: LEFT PRETIBIAL REGION
LOCATION SIMPLE: SCALP
LOCATION SIMPLE: RIGHT UPPER BACK
LOCATION SIMPLE: RIGHT PRETIBIAL REGION
LOCATION SIMPLE: LEFT FOREARM
LOCATION SIMPLE: ABDOMEN
LOCATION SIMPLE: RIGHT LOWER BACK
LOCATION SIMPLE: LEFT LOWER BACK
LOCATION SIMPLE: RIGHT FOREARM
LOCATION SIMPLE: RIGHT OCCIPITAL SCALP
LOCATION SIMPLE: RIGHT FOREHEAD
LOCATION SIMPLE: RIGHT HAND

## 2023-07-27 NOTE — PROCEDURE: PRESCRIPTION MEDICATION MANAGEMENT
Continue Regimen: Soolantra 1% cream
Detail Level: Zone
Render In Strict Bullet Format?: No
Initiate Treatment: minocycline 100 mg capsule BID
Discontinue Regimen: Doxycycline 50mg bid
Initiate Treatment: betamethasone dipropionate 0.05 % topical cream

## 2024-08-01 ENCOUNTER — APPOINTMENT (RX ONLY)
Dept: URBAN - METROPOLITAN AREA CLINIC 139 | Facility: CLINIC | Age: 84
Setting detail: DERMATOLOGY
End: 2024-08-01

## 2024-08-01 DIAGNOSIS — D18.0 HEMANGIOMA: ICD-10-CM

## 2024-08-01 DIAGNOSIS — L71.8 OTHER ROSACEA: ICD-10-CM | Status: IMPROVED

## 2024-08-01 DIAGNOSIS — L82.1 OTHER SEBORRHEIC KERATOSIS: ICD-10-CM

## 2024-08-01 DIAGNOSIS — L57.8 OTHER SKIN CHANGES DUE TO CHRONIC EXPOSURE TO NONIONIZING RADIATION: ICD-10-CM

## 2024-08-01 DIAGNOSIS — L30.0 NUMMULAR DERMATITIS: ICD-10-CM

## 2024-08-01 PROBLEM — D18.01 HEMANGIOMA OF SKIN AND SUBCUTANEOUS TISSUE: Status: ACTIVE | Noted: 2024-08-01

## 2024-08-01 PROCEDURE — ? PRESCRIPTION

## 2024-08-01 PROCEDURE — ? COUNSELING

## 2024-08-01 PROCEDURE — 99213 OFFICE O/P EST LOW 20 MIN: CPT

## 2024-08-01 RX ORDER — BETAMETHASONE DIPROPIONATE 0.5 MG/G
CREAM, AUGMENTED TOPICAL BID
Qty: 15 | Refills: 6 | Status: ERX | COMMUNITY
Start: 2024-08-01

## 2024-08-01 RX ADMIN — BETAMETHASONE DIPROPIONATE: 0.5 CREAM, AUGMENTED TOPICAL at 00:00

## 2024-08-01 ASSESSMENT — LOCATION DETAILED DESCRIPTION DERM
LOCATION DETAILED: EPIGASTRIC SKIN
LOCATION DETAILED: RIGHT INFERIOR MEDIAL UPPER BACK
LOCATION DETAILED: PERIUMBILICAL SKIN
LOCATION DETAILED: LEFT INFERIOR MEDIAL MIDBACK
LOCATION DETAILED: RIGHT SUPERIOR UPPER BACK
LOCATION DETAILED: LEFT SUPERIOR MEDIAL UPPER BACK
LOCATION DETAILED: RIGHT SUPERIOR MEDIAL MIDBACK
LOCATION DETAILED: LEFT SUPERIOR MEDIAL MIDBACK
LOCATION DETAILED: LEFT ANTERIOR PROXIMAL THIGH
LOCATION DETAILED: RIGHT LATERAL ABDOMEN
LOCATION DETAILED: RIGHT INFERIOR UPPER BACK
LOCATION DETAILED: STERNUM
LOCATION DETAILED: LEFT PROXIMAL DORSAL FOREARM
LOCATION DETAILED: RIGHT LATERAL INFERIOR CHEST
LOCATION DETAILED: RIGHT PROXIMAL PRETIBIAL REGION
LOCATION DETAILED: NASAL SUPRATIP
LOCATION DETAILED: LEFT PROXIMAL PRETIBIAL REGION
LOCATION DETAILED: RIGHT MID-UPPER BACK
LOCATION DETAILED: RIGHT MEDIAL SUPERIOR CHEST
LOCATION DETAILED: RIGHT PROXIMAL DORSAL FOREARM
LOCATION DETAILED: LEFT MEDIAL SUPERIOR CHEST
LOCATION DETAILED: RIGHT MEDIAL INFERIOR CHEST
LOCATION DETAILED: LEFT SUPERIOR UPPER BACK
LOCATION DETAILED: LEFT CENTRAL MALAR CHEEK
LOCATION DETAILED: RIGHT INFERIOR CENTRAL MALAR CHEEK

## 2024-08-01 ASSESSMENT — LOCATION SIMPLE DESCRIPTION DERM
LOCATION SIMPLE: LEFT UPPER BACK
LOCATION SIMPLE: LEFT LOWER BACK
LOCATION SIMPLE: CHEST
LOCATION SIMPLE: LEFT PRETIBIAL REGION
LOCATION SIMPLE: RIGHT PRETIBIAL REGION
LOCATION SIMPLE: RIGHT CHEEK
LOCATION SIMPLE: LEFT THIGH
LOCATION SIMPLE: RIGHT UPPER BACK
LOCATION SIMPLE: ABDOMEN
LOCATION SIMPLE: LEFT FOREARM
LOCATION SIMPLE: NOSE
LOCATION SIMPLE: RIGHT LOWER BACK
LOCATION SIMPLE: RIGHT FOREARM
LOCATION SIMPLE: LEFT CHEEK

## 2024-08-01 ASSESSMENT — LOCATION ZONE DERM
LOCATION ZONE: LEG
LOCATION ZONE: NOSE
LOCATION ZONE: FACE
LOCATION ZONE: ARM
LOCATION ZONE: TRUNK

## 2024-08-01 ASSESSMENT — ITCH NUMERIC RATING SCALE: HOW SEVERE IS YOUR ITCHING?: 1

## 2025-08-14 ENCOUNTER — APPOINTMENT (OUTPATIENT)
Dept: URBAN - METROPOLITAN AREA CLINIC 139 | Facility: CLINIC | Age: 85
Setting detail: DERMATOLOGY
End: 2025-08-14

## 2025-08-14 DIAGNOSIS — L57.8 OTHER SKIN CHANGES DUE TO CHRONIC EXPOSURE TO NONIONIZING RADIATION: ICD-10-CM

## 2025-08-14 DIAGNOSIS — L82.1 OTHER SEBORRHEIC KERATOSIS: ICD-10-CM

## 2025-08-14 DIAGNOSIS — D49.2 NEOPLASM OF UNSPECIFIED BEHAVIOR OF BONE, SOFT TISSUE, AND SKIN: ICD-10-CM

## 2025-08-14 DIAGNOSIS — L57.0 ACTINIC KERATOSIS: ICD-10-CM

## 2025-08-14 DIAGNOSIS — D18.0 HEMANGIOMA: ICD-10-CM

## 2025-08-14 PROBLEM — D18.01 HEMANGIOMA OF SKIN AND SUBCUTANEOUS TISSUE: Status: ACTIVE | Noted: 2025-08-14

## 2025-08-14 PROCEDURE — ?

## 2025-08-14 PROCEDURE — ? COUNSELING

## 2025-08-14 PROCEDURE — ?: Mod: 59

## 2025-08-14 PROCEDURE — ? LIQUID NITROGEN

## 2025-08-14 PROCEDURE — ?: Mod: 25

## 2025-08-14 PROCEDURE — ? BIOPSY BY SHAVE METHOD

## 2025-08-14 ASSESSMENT — LOCATION DETAILED DESCRIPTION DERM
LOCATION DETAILED: RIGHT INFERIOR UPPER BACK
LOCATION DETAILED: LEFT SUPERIOR UPPER BACK
LOCATION DETAILED: EPIGASTRIC SKIN
LOCATION DETAILED: RIGHT LATERAL ABDOMEN
LOCATION DETAILED: RIGHT PROXIMAL DORSAL FOREARM
LOCATION DETAILED: RIGHT MEDIAL INFERIOR CHEST
LOCATION DETAILED: RIGHT MEDIAL SUPERIOR CHEST
LOCATION DETAILED: LEFT SUPERIOR MEDIAL MIDBACK
LOCATION DETAILED: LEFT PROXIMAL DORSAL FOREARM
LOCATION DETAILED: RIGHT SUPERIOR UPPER BACK
LOCATION DETAILED: RIGHT PROXIMAL PRETIBIAL REGION
LOCATION DETAILED: LEFT SUPERIOR MEDIAL UPPER BACK
LOCATION DETAILED: PERIUMBILICAL SKIN
LOCATION DETAILED: LEFT MEDIAL SUPERIOR CHEST
LOCATION DETAILED: RIGHT NASAL SIDEWALL
LOCATION DETAILED: STERNUM
LOCATION DETAILED: RIGHT LATERAL INFERIOR CHEST
LOCATION DETAILED: RIGHT INFERIOR MEDIAL UPPER BACK
LOCATION DETAILED: LEFT INFERIOR MEDIAL MIDBACK
LOCATION DETAILED: RIGHT MID-UPPER BACK
LOCATION DETAILED: LEFT PROXIMAL PRETIBIAL REGION
LOCATION DETAILED: RIGHT SUPERIOR MEDIAL MIDBACK

## 2025-08-14 ASSESSMENT — LOCATION ZONE DERM
LOCATION ZONE: TRUNK
LOCATION ZONE: NOSE
LOCATION ZONE: LEG
LOCATION ZONE: ARM

## 2025-08-14 ASSESSMENT — LOCATION SIMPLE DESCRIPTION DERM
LOCATION SIMPLE: RIGHT LOWER BACK
LOCATION SIMPLE: RIGHT NOSE
LOCATION SIMPLE: LEFT UPPER BACK
LOCATION SIMPLE: LEFT FOREARM
LOCATION SIMPLE: ABDOMEN
LOCATION SIMPLE: LEFT PRETIBIAL REGION
LOCATION SIMPLE: LEFT LOWER BACK
LOCATION SIMPLE: RIGHT PRETIBIAL REGION
LOCATION SIMPLE: CHEST
LOCATION SIMPLE: RIGHT UPPER BACK
LOCATION SIMPLE: RIGHT FOREARM

## 2025-08-22 ENCOUNTER — APPOINTMENT (OUTPATIENT)
Dept: URBAN - METROPOLITAN AREA CLINIC 138 | Facility: CLINIC | Age: 85
Setting detail: DERMATOLOGY
End: 2025-08-22

## 2025-08-22 DIAGNOSIS — Z01.818 ENCOUNTER FOR OTHER PREPROCEDURAL EXAMINATION: ICD-10-CM

## 2025-08-22 PROCEDURE — ? ADDITIONAL NOTES
